# Patient Record
Sex: FEMALE | Race: WHITE | NOT HISPANIC OR LATINO | Employment: UNEMPLOYED | URBAN - METROPOLITAN AREA
[De-identification: names, ages, dates, MRNs, and addresses within clinical notes are randomized per-mention and may not be internally consistent; named-entity substitution may affect disease eponyms.]

---

## 2019-01-24 ENCOUNTER — HOSPITAL ENCOUNTER (OUTPATIENT)
Dept: CARDIOLOGY | Facility: HOSPITAL | Age: 39
Discharge: HOME | End: 2019-01-24
Attending: INTERNAL MEDICINE

## 2019-01-24 ENCOUNTER — HOSPITAL ENCOUNTER (OUTPATIENT)
Dept: CARDIOLOGY | Facility: HOSPITAL | Age: 39
Discharge: HOME | End: 2019-01-24
Admitting: INTERNAL MEDICINE

## 2019-01-24 VITALS
HEIGHT: 66 IN | OXYGEN SATURATION: 100 % | HEART RATE: 86 BPM | SYSTOLIC BLOOD PRESSURE: 140 MMHG | BODY MASS INDEX: 22.5 KG/M2 | DIASTOLIC BLOOD PRESSURE: 100 MMHG | WEIGHT: 140 LBS

## 2019-01-24 VITALS
BODY MASS INDEX: 22.5 KG/M2 | SYSTOLIC BLOOD PRESSURE: 143 MMHG | WEIGHT: 140 LBS | HEIGHT: 66 IN | HEART RATE: 84 BPM | OXYGEN SATURATION: 100 % | RESPIRATION RATE: 18 BRPM | DIASTOLIC BLOOD PRESSURE: 100 MMHG

## 2019-01-24 DIAGNOSIS — R07.9 CHEST PAIN, UNSPECIFIED TYPE: ICD-10-CM

## 2019-01-24 DIAGNOSIS — R74.8 ELEVATED LIVER ENZYMES: Primary | ICD-10-CM

## 2019-01-24 DIAGNOSIS — R06.02 SHORTNESS OF BREATH: ICD-10-CM

## 2019-01-24 DIAGNOSIS — I10 ESSENTIAL HYPERTENSION: ICD-10-CM

## 2019-01-24 DIAGNOSIS — R79.89 LOW TSH LEVEL: ICD-10-CM

## 2019-01-24 LAB
ALBUMIN SERPL-MCNC: 4.9 G/DL (ref 3.5–5.2)
ALBUMIN/GLOB SERPL: 1.5 G/DL
ALP SERPL-CCNC: 59 U/L (ref 39–117)
ALT SERPL W P-5'-P-CCNC: 205 U/L (ref 1–33)
ANION GAP SERPL CALCULATED.3IONS-SCNC: 14.9 MMOL/L
AORTIC DIMENSIONLESS INDEX: 0.7 (DI)
ASCENDING AORTA: 2.5 CM
AST SERPL-CCNC: 223 U/L (ref 1–32)
BASOPHILS # BLD AUTO: 0.01 10*3/MM3 (ref 0–0.2)
BASOPHILS NFR BLD AUTO: 0.1 % (ref 0–1.5)
BH CV ECHO MEAS - ACS: 1.8 CM
BH CV ECHO MEAS - AO MAX PG (FULL): 3.6 MMHG
BH CV ECHO MEAS - AO MAX PG: 6.8 MMHG
BH CV ECHO MEAS - AO MEAN PG (FULL): 2.1 MMHG
BH CV ECHO MEAS - AO MEAN PG: 4.1 MMHG
BH CV ECHO MEAS - AO ROOT AREA (BSA CORRECTED): 1.4
BH CV ECHO MEAS - AO ROOT AREA: 4.5 CM^2
BH CV ECHO MEAS - AO ROOT DIAM: 2.4 CM
BH CV ECHO MEAS - AO V2 MAX: 130.3 CM/SEC
BH CV ECHO MEAS - AO V2 MEAN: 95.6 CM/SEC
BH CV ECHO MEAS - AO V2 VTI: 26.2 CM
BH CV ECHO MEAS - ASC AORTA: 2.5 CM
BH CV ECHO MEAS - AVA(I,A): 1.8 CM^2
BH CV ECHO MEAS - AVA(I,D): 1.8 CM^2
BH CV ECHO MEAS - AVA(V,A): 1.8 CM^2
BH CV ECHO MEAS - AVA(V,D): 1.8 CM^2
BH CV ECHO MEAS - BSA(HAYCOCK): 1.7 M^2
BH CV ECHO MEAS - BSA: 1.7 M^2
BH CV ECHO MEAS - BZI_BMI: 22.6 KILOGRAMS/M^2
BH CV ECHO MEAS - BZI_METRIC_HEIGHT: 167.6 CM
BH CV ECHO MEAS - BZI_METRIC_WEIGHT: 63.5 KG
BH CV ECHO MEAS - EDV(CUBED): 77.5 ML
BH CV ECHO MEAS - EDV(MOD-SP2): 72 ML
BH CV ECHO MEAS - EDV(MOD-SP4): 61 ML
BH CV ECHO MEAS - EDV(TEICH): 81.4 ML
BH CV ECHO MEAS - EF(CUBED): 78 %
BH CV ECHO MEAS - EF(MOD-BP): 61 %
BH CV ECHO MEAS - EF(MOD-SP2): 58.3 %
BH CV ECHO MEAS - EF(MOD-SP4): 62.3 %
BH CV ECHO MEAS - EF(TEICH): 70.5 %
BH CV ECHO MEAS - ESV(CUBED): 17 ML
BH CV ECHO MEAS - ESV(MOD-SP2): 30 ML
BH CV ECHO MEAS - ESV(MOD-SP4): 23 ML
BH CV ECHO MEAS - ESV(TEICH): 24 ML
BH CV ECHO MEAS - FS: 39.6 %
BH CV ECHO MEAS - IVS/LVPW: 1.1
BH CV ECHO MEAS - IVSD: 0.96 CM
BH CV ECHO MEAS - LAT PEAK E' VEL: 15 CM/SEC
BH CV ECHO MEAS - LV DIASTOLIC VOL/BSA (35-75): 35.5 ML/M^2
BH CV ECHO MEAS - LV MASS(C)D: 125.6 GRAMS
BH CV ECHO MEAS - LV MASS(C)DI: 73.1 GRAMS/M^2
BH CV ECHO MEAS - LV MAX PG: 3.2 MMHG
BH CV ECHO MEAS - LV MEAN PG: 1.9 MMHG
BH CV ECHO MEAS - LV SYSTOLIC VOL/BSA (12-30): 13.4 ML/M^2
BH CV ECHO MEAS - LV V1 MAX: 89.7 CM/SEC
BH CV ECHO MEAS - LV V1 MEAN: 66.2 CM/SEC
BH CV ECHO MEAS - LV V1 VTI: 17.7 CM
BH CV ECHO MEAS - LVIDD: 4.3 CM
BH CV ECHO MEAS - LVIDS: 2.6 CM
BH CV ECHO MEAS - LVLD AP2: 9 CM
BH CV ECHO MEAS - LVLD AP4: 8.7 CM
BH CV ECHO MEAS - LVLS AP2: 7.2 CM
BH CV ECHO MEAS - LVLS AP4: 6.9 CM
BH CV ECHO MEAS - LVOT AREA (M): 2.5 CM^2
BH CV ECHO MEAS - LVOT AREA: 2.6 CM^2
BH CV ECHO MEAS - LVOT DIAM: 1.8 CM
BH CV ECHO MEAS - LVPWD: 0.88 CM
BH CV ECHO MEAS - MED PEAK E' VEL: 12 CM/SEC
BH CV ECHO MEAS - MV A DUR: 0.11 SEC
BH CV ECHO MEAS - MV A MAX VEL: 60.6 CM/SEC
BH CV ECHO MEAS - MV DEC SLOPE: 310.3 CM/SEC^2
BH CV ECHO MEAS - MV DEC TIME: 0.25 SEC
BH CV ECHO MEAS - MV E MAX VEL: 58.7 CM/SEC
BH CV ECHO MEAS - MV E/A: 0.97
BH CV ECHO MEAS - MV MAX PG: 2.1 MMHG
BH CV ECHO MEAS - MV MEAN PG: 1.2 MMHG
BH CV ECHO MEAS - MV P1/2T MAX VEL: 72.1 CM/SEC
BH CV ECHO MEAS - MV P1/2T: 68.1 MSEC
BH CV ECHO MEAS - MV V2 MAX: 73 CM/SEC
BH CV ECHO MEAS - MV V2 MEAN: 52.5 CM/SEC
BH CV ECHO MEAS - MV V2 VTI: 22.4 CM
BH CV ECHO MEAS - MVA P1/2T LCG: 3.1 CM^2
BH CV ECHO MEAS - MVA(P1/2T): 3.2 CM^2
BH CV ECHO MEAS - MVA(VTI): 2.1 CM^2
BH CV ECHO MEAS - PA ACC TIME: 0.07 SEC
BH CV ECHO MEAS - PA MAX PG (FULL): 3.2 MMHG
BH CV ECHO MEAS - PA MAX PG: 5.2 MMHG
BH CV ECHO MEAS - PA PR(ACCEL): 45.7 MMHG
BH CV ECHO MEAS - PA V2 MAX: 114 CM/SEC
BH CV ECHO MEAS - PULM A REVS DUR: 0.1 SEC
BH CV ECHO MEAS - PULM A REVS VEL: 64 CM/SEC
BH CV ECHO MEAS - PULM DIAS VEL: 66 CM/SEC
BH CV ECHO MEAS - PULM S/D: 0.91
BH CV ECHO MEAS - PULM SYS VEL: 60.1 CM/SEC
BH CV ECHO MEAS - PVA(V,A): 1.6 CM^2
BH CV ECHO MEAS - PVA(V,D): 1.6 CM^2
BH CV ECHO MEAS - QP/QS: 1
BH CV ECHO MEAS - RV MAX PG: 2 MMHG
BH CV ECHO MEAS - RV MEAN PG: 1.2 MMHG
BH CV ECHO MEAS - RV V1 MAX: 69.8 CM/SEC
BH CV ECHO MEAS - RV V1 MEAN: 52.3 CM/SEC
BH CV ECHO MEAS - RV V1 VTI: 18.1 CM
BH CV ECHO MEAS - RVOT AREA: 2.7 CM^2
BH CV ECHO MEAS - RVOT DIAM: 1.8 CM
BH CV ECHO MEAS - SI(AO): 68 ML/M^2
BH CV ECHO MEAS - SI(CUBED): 35.2 ML/M^2
BH CV ECHO MEAS - SI(LVOT): 27.2 ML/M^2
BH CV ECHO MEAS - SI(MOD-SP2): 24.4 ML/M^2
BH CV ECHO MEAS - SI(MOD-SP4): 22.1 ML/M^2
BH CV ECHO MEAS - SI(TEICH): 33.4 ML/M^2
BH CV ECHO MEAS - SUP REN AO DIAM: 1.7 CM
BH CV ECHO MEAS - SV(AO): 116.9 ML
BH CV ECHO MEAS - SV(CUBED): 60.5 ML
BH CV ECHO MEAS - SV(LVOT): 46.8 ML
BH CV ECHO MEAS - SV(MOD-SP2): 42 ML
BH CV ECHO MEAS - SV(MOD-SP4): 38 ML
BH CV ECHO MEAS - SV(RVOT): 48.2 ML
BH CV ECHO MEAS - SV(TEICH): 57.4 ML
BH CV ECHO MEAS - TAPSE (>1.6): 2 CM2
BH CV ECHO MEASUREMENTS AVERAGE E/E' RATIO: 4.35
BH CV STRESS BP STAGE 1: NORMAL
BH CV STRESS BP STAGE 2: NORMAL
BH CV STRESS BP STAGE 3: NORMAL
BH CV STRESS DURATION MIN STAGE 1: 3
BH CV STRESS DURATION MIN STAGE 2: 3
BH CV STRESS DURATION MIN STAGE 3: 3
BH CV STRESS DURATION SEC STAGE 1: 0
BH CV STRESS DURATION SEC STAGE 2: 0
BH CV STRESS DURATION SEC STAGE 3: 0
BH CV STRESS GRADE STAGE 1: 10
BH CV STRESS GRADE STAGE 2: 12
BH CV STRESS GRADE STAGE 3: 14
BH CV STRESS HR STAGE 1: 128
BH CV STRESS HR STAGE 2: 162
BH CV STRESS HR STAGE 3: 179
BH CV STRESS METS STAGE 1: 5
BH CV STRESS METS STAGE 2: 7.5
BH CV STRESS METS STAGE 3: 10
BH CV STRESS PROTOCOL 1: NORMAL
BH CV STRESS RECOVERY BP: NORMAL MMHG
BH CV STRESS RECOVERY HR: 101 BPM
BH CV STRESS SPEED STAGE 1: 1.7
BH CV STRESS SPEED STAGE 2: 2.5
BH CV STRESS SPEED STAGE 3: 3.4
BH CV STRESS STAGE 1: 1
BH CV STRESS STAGE 2: 2
BH CV STRESS STAGE 3: 3
BH CV VAS BP RIGHT ARM: NORMAL MMHG
BH CV XLRA - RV BASE: 2.87 CM
BH CV XLRA - TDI S': 10 CM/SEC
BILIRUB SERPL-MCNC: 0.8 MG/DL (ref 0.1–1.2)
BUN BLD-MCNC: 14 MG/DL (ref 6–20)
BUN/CREAT SERPL: 18.9 (ref 7–25)
CALCIUM SPEC-SCNC: 10 MG/DL (ref 8.6–10.5)
CHLORIDE SERPL-SCNC: 96 MMOL/L (ref 98–107)
CO2 SERPL-SCNC: 26.1 MMOL/L (ref 22–29)
CREAT BLD-MCNC: 0.74 MG/DL (ref 0.57–1)
D DIMER PPP FEU-MCNC: <0.27 MCGFEU/ML (ref 0–0.49)
DEPRECATED RDW RBC AUTO: 43.4 FL (ref 37–54)
EOSINOPHIL # BLD AUTO: 0.08 10*3/MM3 (ref 0–0.7)
EOSINOPHIL NFR BLD AUTO: 1.2 % (ref 0.3–6.2)
ERYTHROCYTE [DISTWIDTH] IN BLOOD BY AUTOMATED COUNT: 12 % (ref 11.7–13)
GFR SERPL CREATININE-BSD FRML MDRD: 88 ML/MIN/1.73
GLOBULIN UR ELPH-MCNC: 3.2 GM/DL
GLUCOSE BLD-MCNC: 111 MG/DL (ref 65–99)
HCT VFR BLD AUTO: 42.2 % (ref 35.6–45.5)
HGB BLD-MCNC: 14.7 G/DL (ref 11.9–15.5)
IMM GRANULOCYTES # BLD AUTO: 0 10*3/MM3 (ref 0–0.03)
IMM GRANULOCYTES NFR BLD AUTO: 0 % (ref 0–0.5)
LEFT ATRIUM VOLUME INDEX: 12 ML/M2
LYMPHOCYTES # BLD AUTO: 1.02 10*3/MM3 (ref 0.9–4.8)
LYMPHOCYTES NFR BLD AUTO: 14.8 % (ref 19.6–45.3)
MAXIMAL PREDICTED HEART RATE: 183 BPM
MAXIMAL PREDICTED HEART RATE: 183 BPM
MCH RBC QN AUTO: 34.2 PG (ref 26.9–32)
MCHC RBC AUTO-ENTMCNC: 34.8 G/DL (ref 32.4–36.3)
MCV RBC AUTO: 98.1 FL (ref 80.5–98.2)
MONOCYTES # BLD AUTO: 0.55 10*3/MM3 (ref 0.2–1.2)
MONOCYTES NFR BLD AUTO: 8 % (ref 5–12)
NEUTROPHILS # BLD AUTO: 5.23 10*3/MM3 (ref 1.9–8.1)
NEUTROPHILS NFR BLD AUTO: 75.9 % (ref 42.7–76)
NT-PROBNP SERPL-MCNC: 5.7 PG/ML (ref 0–450)
PERCENT MAX PREDICTED HR: 96.17 %
PLATELET # BLD AUTO: 209 10*3/MM3 (ref 140–500)
PMV BLD AUTO: 9.2 FL (ref 6–12)
POTASSIUM BLD-SCNC: 3.7 MMOL/L (ref 3.5–5.2)
PROT SERPL-MCNC: 8.1 G/DL (ref 6–8.5)
RBC # BLD AUTO: 4.3 10*6/MM3 (ref 3.9–5.2)
SINUS: 2.46 CM
SODIUM BLD-SCNC: 137 MMOL/L (ref 136–145)
STJ: 2.3 CM
STRESS BASELINE BP: NORMAL MMHG
STRESS BASELINE HR: 86 BPM
STRESS PERCENT HR: 113 %
STRESS POST ESTIMATED WORKLOAD: 10 METS
STRESS POST EXERCISE DUR MIN: 9 MIN
STRESS POST EXERCISE DUR SEC: 0 SEC
STRESS POST PEAK BP: NORMAL MMHG
STRESS POST PEAK HR: 176 BPM
STRESS TARGET HR: 156 BPM
STRESS TARGET HR: 156 BPM
TROPONIN T SERPL-MCNC: <0.01 NG/ML (ref 0–0.03)
WBC NRBC COR # BLD: 6.89 10*3/MM3 (ref 4.5–10.7)

## 2019-01-24 PROCEDURE — 84484 ASSAY OF TROPONIN QUANT: CPT | Performed by: INTERNAL MEDICINE

## 2019-01-24 PROCEDURE — 93306 TTE W/DOPPLER COMPLETE: CPT | Performed by: INTERNAL MEDICINE

## 2019-01-24 PROCEDURE — 93306 TTE W/DOPPLER COMPLETE: CPT

## 2019-01-24 PROCEDURE — 93017 CV STRESS TEST TRACING ONLY: CPT

## 2019-01-24 PROCEDURE — 94760 N-INVAS EAR/PLS OXIMETRY 1: CPT

## 2019-01-24 PROCEDURE — 85379 FIBRIN DEGRADATION QUANT: CPT | Performed by: INTERNAL MEDICINE

## 2019-01-24 PROCEDURE — 83880 ASSAY OF NATRIURETIC PEPTIDE: CPT | Performed by: INTERNAL MEDICINE

## 2019-01-24 PROCEDURE — 85025 COMPLETE CBC W/AUTO DIFF WBC: CPT | Performed by: INTERNAL MEDICINE

## 2019-01-24 PROCEDURE — 99244 OFF/OP CNSLTJ NEW/EST MOD 40: CPT | Performed by: INTERNAL MEDICINE

## 2019-01-24 PROCEDURE — 36415 COLL VENOUS BLD VENIPUNCTURE: CPT

## 2019-01-24 PROCEDURE — 80053 COMPREHEN METABOLIC PANEL: CPT | Performed by: INTERNAL MEDICINE

## 2019-01-24 PROCEDURE — 93016 CV STRESS TEST SUPVJ ONLY: CPT | Performed by: INTERNAL MEDICINE

## 2019-01-24 PROCEDURE — 93018 CV STRESS TEST I&R ONLY: CPT | Performed by: INTERNAL MEDICINE

## 2019-01-24 RX ORDER — SODIUM CHLORIDE 0.9 % (FLUSH) 0.9 %
10 SYRINGE (ML) INJECTION AS NEEDED
Status: SHIPPED | OUTPATIENT
Start: 2019-01-24

## 2019-01-24 RX ORDER — NITROGLYCERIN 0.4 MG/1
0.4 TABLET SUBLINGUAL
Status: SHIPPED | OUTPATIENT
Start: 2019-01-24

## 2019-01-24 NOTE — ADDENDUM NOTE
Encounter addended by: Rigo Nair RN on: 1/24/2019 1:51 PM   Actions taken: LDA properties accepted, Sign clinical note, Charge Capture section accepted

## 2019-01-24 NOTE — PATIENT INSTRUCTIONS
As directed by Dr. Coelho, follow-up with your primary care provider regarding your liver enzymes.

## 2019-01-24 NOTE — PROGRESS NOTES
Date of Office Visit: 19  Encounter Provider: Safia Coelho MD  Place of Service: [facility] CARDIOLOGY  Patient Name: Nay Tello  :1980  Referral Provider:Mikhail Lundberg MD      She was referred here for consultation evaluation for chest tightness by Raúl STERLING.    She has a known history of hypertension and anxiety.  She's noticed for the last week and half that she's had chest tightness.  She went on a run with her kids.  Next morning, she woke up with chest tightness.  It is gone progressively worse since then.  This morning she had a very bad episode of her chest was very tight, she felt her heart racing.  She felt a sense of impending doom.  She didn't pass out.  She was shaky, weak and sweaty.  She was also having significant dyspnea.  She's been having episodes like this intermittently.  When they come on they last for 5-10 minutes and then they finally dissipate.    She's never had any prior heart rhythm disturbances but she's had a chronically low TSH and it was checked 19 at 0.262.    She has no history of myocardial infarction or heart failure.  She's never had rheumatic fever or heart rhythm disturbances.  She has no history of diabetes or hyperlipidemia.  Her lipids from 2019 and her LDL was 167, HDL 87.    She has had a recent change her medications in that they started lisinopril HCT because her blood pressure was elevated. She was on just lisinopril prior.     She's never had cancer, blood clots, renal disease.  She doesn't smoke but has 2 glasses of wine roughly per day.  She uses no Drugs.  She uses no decongestants or diet pills and no supplements.    There is no family history of early cardiac disease.    Her troponin, d-dimer, proBNP, CBC were normal.  CMP was normal except  and  - this was normal 1 year ago.     Chief Complaint   Patient presents with   • Chest Pain     Chest tightness accompanied  by shortness of breath for the last two weeks     History of Present Illness  History of Present Illness      Past Medical History:   Diagnosis Date   • Anemia     H/O iron deficiency   • Disease of thyroid gland    • Hypertension          Past Surgical History:   Procedure Laterality Date   • LEEP           Current Outpatient Medications on File Prior to Encounter   Medication Sig Dispense Refill   • lisinopril-hydrochlorothiazide (PRINZIDE,ZESTORETIC) 10-12.5 MG per tablet Take 1 tablet by mouth Daily.     • sertraline (ZOLOFT) 25 MG tablet Take 25 mg by mouth.     • [DISCONTINUED] Omeprazole Magnesium (PRILOSEC OTC PO) Take  by mouth.     • [DISCONTINUED] Prenatal Vit-Fe Fumarate-FA (PRENATAL PO) Take  by mouth.       No current facility-administered medications on file prior to encounter.          Social History     Socioeconomic History   • Marital status:      Spouse name: Not on file   • Number of children: Not on file   • Years of education: College   • Highest education level: Not on file   Social Needs   • Financial resource strain: Not on file   • Food insecurity - worry: Not on file   • Food insecurity - inability: Not on file   • Transportation needs - medical: Not on file   • Transportation needs - non-medical: Not on file   Occupational History     Employer: UNEMPLOYED   Tobacco Use   • Smoking status: Former Smoker     Packs/day: 0.50     Years: 10.00     Pack years: 5.00     Types: Cigarettes   • Tobacco comment: Social only   Substance and Sexual Activity   • Alcohol use: Yes     Comment: DAILY   • Drug use: No   • Sexual activity: Yes     Partners: Male     Comment:     Other Topics Concern   • Not on file   Social History Narrative   • Not on file       Family History   Problem Relation Age of Onset   • Thyroid disease Mother    • Hypertension Father          Review of Systems   Constitution: Negative.   HENT: Negative for congestion.    Eyes: Negative for vision loss in left eye  "and vision loss in right eye.   Respiratory: Negative.  Negative for cough, hemoptysis, shortness of breath, sleep disturbances due to breathing, snoring, sputum production and wheezing.    Endocrine: Negative.    Hematologic/Lymphatic: Negative.    Skin: Negative for poor wound healing and rash.   Musculoskeletal: Negative for falls, gout, muscle cramps and myalgias.   Gastrointestinal: Negative for abdominal pain, diarrhea, dysphagia, hematemesis, melena, nausea and vomiting.   Neurological: Negative for excessive daytime sleepiness, dizziness, headaches, light-headedness, loss of balance, seizures and vertigo.   Psychiatric/Behavioral: Negative for depression and substance abuse. The patient is not nervous/anxious.        Procedures    Procedures        Objective:    /100 (BP Location: Left arm, Patient Position: Sitting) Comment: 155/97 right arm  Pulse 84   Resp 18   Ht 167.6 cm (66\")   Wt 63.5 kg (140 lb)   SpO2 100%   BMI 22.60 kg/m²        Physical Exam  Physical Exam   Constitutional: She is oriented to person, place, and time. She appears well-developed and well-nourished. No distress.   HENT:   Head: Normocephalic and atraumatic.   Eyes: Conjunctivae are normal. No scleral icterus.   Neck: Neck supple. No JVD present. Carotid bruit is not present. No thyromegaly present.   Cardiovascular: Normal rate, regular rhythm, S1 normal, S2 normal, normal heart sounds and intact distal pulses.  No extrasystoles are present. PMI is not displaced. Exam reveals no gallop.   No murmur heard.  Pulses:       Carotid pulses are 2+ on the right side, and 2+ on the left side.       Radial pulses are 2+ on the right side, and 2+ on the left side.        Dorsalis pedis pulses are 2+ on the right side, and 2+ on the left side.        Posterior tibial pulses are 2+ on the right side, and 2+ on the left side.   Pulmonary/Chest: Effort normal and breath sounds normal. No respiratory distress. She has no wheezes. She " has no rhonchi. She has no rales. She exhibits no tenderness.   Abdominal: Soft. Bowel sounds are normal. She exhibits no distension, no abdominal bruit and no mass. There is no tenderness.   Musculoskeletal: She exhibits no edema or deformity.   Lymphadenopathy:     She has no cervical adenopathy.   Neurological: She is alert and oriented to person, place, and time. No cranial nerve deficit.   Skin: Skin is warm and dry. No rash noted. She is not diaphoretic. No cyanosis. No pallor. Nails show no clubbing.   Psychiatric: She has a normal mood and affect. Judgment normal.   Vitals reviewed.          Assessment:      1. Chest pain with dyspnea and tachycardia, set up treadmill stress study and echocardiogram.  If normal, home with holter.   2. Hypertension. BP is still high.  3. Low TSH, chronic.  4. Elevated liver enzymes, this is new.        Plan:       F/u in 1 month with melody if testing is normal, stop alcohol and acetaminophen.      Addendum: treadmill stress and echo are normal - home with holter.

## 2021-04-16 ENCOUNTER — BULK ORDERING (OUTPATIENT)
Dept: CASE MANAGEMENT | Facility: OTHER | Age: 41
End: 2021-04-16

## 2021-04-16 DIAGNOSIS — Z23 IMMUNIZATION DUE: ICD-10-CM

## 2022-11-04 ENCOUNTER — APPOINTMENT (OUTPATIENT)
Dept: WOMENS IMAGING | Facility: HOSPITAL | Age: 42
End: 2022-11-04

## 2022-11-04 PROCEDURE — 77067 SCR MAMMO BI INCL CAD: CPT | Performed by: RADIOLOGY

## 2022-11-04 PROCEDURE — 77063 BREAST TOMOSYNTHESIS BI: CPT | Performed by: RADIOLOGY

## 2024-09-03 ENCOUNTER — TELEPHONE (OUTPATIENT)
Dept: INTERNAL MEDICINE | Facility: CLINIC | Age: 44
End: 2024-09-03

## 2024-09-03 NOTE — TELEPHONE ENCOUNTER
Caller: Eliza Tello    Relationship: Self    Best call back number: [phone]     What was the call regarding: ELIZA STATES HER  TAMMY TELLO IS A CURRENT PATIENT OF DR FLANAGAN SO SHE IS WANTING TO KNOW IF HE IS WILLING TO TAKE HER ON AS A PATIENT AS WELL. SHE STATES SHE JUST NEEDS A PHYSICAL

## 2024-11-01 ENCOUNTER — OFFICE VISIT (OUTPATIENT)
Dept: INTERNAL MEDICINE | Facility: CLINIC | Age: 44
End: 2024-11-01
Payer: COMMERCIAL

## 2024-11-01 VITALS
RESPIRATION RATE: 16 BRPM | DIASTOLIC BLOOD PRESSURE: 92 MMHG | BODY MASS INDEX: 22.5 KG/M2 | HEIGHT: 66 IN | TEMPERATURE: 98.5 F | WEIGHT: 140 LBS | SYSTOLIC BLOOD PRESSURE: 134 MMHG | HEART RATE: 89 BPM | OXYGEN SATURATION: 98 %

## 2024-11-01 DIAGNOSIS — Z00.00 HEALTHCARE MAINTENANCE: ICD-10-CM

## 2024-11-01 DIAGNOSIS — Z12.31 ENCOUNTER FOR SCREENING MAMMOGRAM FOR MALIGNANT NEOPLASM OF BREAST: ICD-10-CM

## 2024-11-01 DIAGNOSIS — Z00.00 WELL WOMAN EXAM (NO GYNECOLOGICAL EXAM): Primary | ICD-10-CM

## 2024-11-01 NOTE — PROGRESS NOTES
Subjective   Nay Tello is a 44 y.o. female and is here for a comprehensive physical exam. The patient reports no problems.    Pt is due for annual gyn exam and mammo           Social History:   Social History     Socioeconomic History    Marital status:     Years of education: College   Tobacco Use    Smoking status: Former     Current packs/day: 0.50     Average packs/day: 0.5 packs/day for 10.0 years (5.0 ttl pk-yrs)     Types: Cigarettes    Tobacco comments:     Social only   Vaping Use    Vaping status: Never Used   Substance and Sexual Activity    Alcohol use: Yes     Alcohol/week: 2.0 standard drinks of alcohol     Types: 2 Glasses of wine per week     Comment: DAILY    Drug use: No    Sexual activity: Yes     Partners: Male     Comment:         Family History:   Family History   Problem Relation Age of Onset    Thyroid disease Mother     Hypertension Father        Past Medical History:   Past Medical History:   Diagnosis Date    Anemia     H/O iron deficiency    Disease of thyroid gland     Hypertension        The following portions of the patient's history were reviewed and updated as appropriate: allergies, current medications, past family history, past medical history, past social history, past surgical history and problem list.    Review of Systems    Review of Systems   Constitutional:  Negative for chills and fever.   HENT:  Negative for congestion, rhinorrhea, sinus pain and sore throat.    Eyes:  Negative for photophobia and visual disturbance.   Respiratory:  Negative for cough, chest tightness and shortness of breath.    Cardiovascular:  Negative for chest pain and palpitations.   Gastrointestinal:  Negative for diarrhea, nausea and vomiting.   Genitourinary:  Negative for dysuria, frequency and urgency.   Skin:  Negative for rash and wound.   Neurological:  Negative for dizziness and syncope.   Psychiatric/Behavioral:  Negative for behavioral problems and confusion.         Objective   Physical Exam  Vitals and nursing note reviewed.   Constitutional:       Appearance: She is well-developed.   HENT:      Head: Normocephalic and atraumatic.      Right Ear: External ear normal.      Left Ear: External ear normal.   Cardiovascular:      Rate and Rhythm: Normal rate and regular rhythm.      Heart sounds: Normal heart sounds.   Pulmonary:      Effort: Pulmonary effort is normal. No respiratory distress.      Breath sounds: Normal breath sounds.   Abdominal:      Palpations: Abdomen is soft.      Tenderness: There is no abdominal tenderness. There is no guarding.   Musculoskeletal:         General: Normal range of motion.      Cervical back: Normal range of motion and neck supple.   Lymphadenopathy:      Cervical: No cervical adenopathy.   Skin:     General: Skin is warm.   Neurological:      Mental Status: She is alert and oriented to person, place, and time.   Psychiatric:         Behavior: Behavior normal.         Vitals:    11/01/24 0900   BP: 134/92   Pulse: 89   Resp: 16   Temp: 98.5 °F (36.9 °C)   SpO2: 98%     Body mass index is 22.6 kg/m².      Medications: No current outpatient medications on file.    Current Facility-Administered Medications:     nitroglycerin (NITROSTAT) SL tablet 0.4 mg, 0.4 mg, Sublingual, Q5 Min PRN, Safia Coelho MD    sodium chloride 0.9 % flush 10 mL, 10 mL, Intravenous, PRN, Safia Coelho MD       Assessment & Plan   Healthy female exam.      1. Healthcare Maintenance:  2. Patient Counseling:  --Nutrition: Stressed importance of moderation in sodium/caffeine intake, saturated fat and cholesterol, caloric balance, sufficient intake of fresh fruits, vegetables, fiber, calcium and vit D  --Exercise: Recommended 30 minutes of exercise daily.  --Immunizations reviewed.  --Discussed benefits of screening colonoscopy.    Diagnoses and all orders for this visit:    Well woman exam (no gynecological exam)    Healthcare maintenance  -     CBC &  Differential  -     Comprehensive Metabolic Panel  -     Hemoglobin A1c  -     Thyroid Panel With TSH  -     Lipid Panel With LDL / HDL Ratio  -     Vitamin D,25-Hydroxy    Encounter for screening mammogram for malignant neoplasm of breast  -     Mammo Screening Digital Tomosynthesis Bilateral With CAD; Future        No follow-ups on file.             Dictated utilizing Dragon Voice Recognition Software

## 2024-11-02 LAB
25(OH)D3+25(OH)D2 SERPL-MCNC: 33.6 NG/ML (ref 30–100)
ALBUMIN SERPL-MCNC: 4.6 G/DL (ref 3.5–5.2)
ALBUMIN/GLOB SERPL: 1.6 G/DL
ALP SERPL-CCNC: 65 U/L (ref 39–117)
ALT SERPL-CCNC: 145 U/L (ref 1–33)
AST SERPL-CCNC: 189 U/L (ref 1–32)
BASOPHILS # BLD AUTO: 0.02 10*3/MM3 (ref 0–0.2)
BASOPHILS NFR BLD AUTO: 0.4 % (ref 0–1.5)
BILIRUB SERPL-MCNC: 0.8 MG/DL (ref 0–1.2)
BUN SERPL-MCNC: 11 MG/DL (ref 6–20)
BUN/CREAT SERPL: 17.2 (ref 7–25)
CALCIUM SERPL-MCNC: 9.5 MG/DL (ref 8.6–10.5)
CHLORIDE SERPL-SCNC: 99 MMOL/L (ref 98–107)
CHOLEST SERPL-MCNC: 277 MG/DL (ref 0–200)
CO2 SERPL-SCNC: 21.1 MMOL/L (ref 22–29)
CREAT SERPL-MCNC: 0.64 MG/DL (ref 0.57–1)
EGFRCR SERPLBLD CKD-EPI 2021: 112.6 ML/MIN/1.73
EOSINOPHIL # BLD AUTO: 0.11 10*3/MM3 (ref 0–0.4)
EOSINOPHIL NFR BLD AUTO: 1.9 % (ref 0.3–6.2)
ERYTHROCYTE [DISTWIDTH] IN BLOOD BY AUTOMATED COUNT: 12.3 % (ref 12.3–15.4)
FT4I SERPL CALC-MCNC: 1.7 (ref 1.2–4.9)
GLOBULIN SER CALC-MCNC: 2.8 GM/DL
GLUCOSE SERPL-MCNC: 76 MG/DL (ref 65–99)
HBA1C MFR BLD: 4.5 % (ref 4.8–5.6)
HCT VFR BLD AUTO: 38.3 % (ref 34–46.6)
HDLC SERPL-MCNC: 78 MG/DL (ref 40–60)
HGB BLD-MCNC: 13.4 G/DL (ref 12–15.9)
IMM GRANULOCYTES # BLD AUTO: 0.02 10*3/MM3 (ref 0–0.05)
IMM GRANULOCYTES NFR BLD AUTO: 0.4 % (ref 0–0.5)
LDLC SERPL CALC-MCNC: 164 MG/DL (ref 0–100)
LDLC/HDLC SERPL: 2.06 {RATIO}
LYMPHOCYTES # BLD AUTO: 1.46 10*3/MM3 (ref 0.7–3.1)
LYMPHOCYTES NFR BLD AUTO: 25.6 % (ref 19.6–45.3)
MCH RBC QN AUTO: 34.4 PG (ref 26.6–33)
MCHC RBC AUTO-ENTMCNC: 35 G/DL (ref 31.5–35.7)
MCV RBC AUTO: 98.2 FL (ref 79–97)
MONOCYTES # BLD AUTO: 0.54 10*3/MM3 (ref 0.1–0.9)
MONOCYTES NFR BLD AUTO: 9.5 % (ref 5–12)
NEUTROPHILS # BLD AUTO: 3.55 10*3/MM3 (ref 1.7–7)
NEUTROPHILS NFR BLD AUTO: 62.2 % (ref 42.7–76)
NRBC BLD AUTO-RTO: 0 /100 WBC (ref 0–0.2)
PLATELET # BLD AUTO: 198 10*3/MM3 (ref 140–450)
POTASSIUM SERPL-SCNC: 4.3 MMOL/L (ref 3.5–5.2)
PROT SERPL-MCNC: 7.4 G/DL (ref 6–8.5)
RBC # BLD AUTO: 3.9 10*6/MM3 (ref 3.77–5.28)
SODIUM SERPL-SCNC: 138 MMOL/L (ref 136–145)
T3RU NFR SERPL: 24 % (ref 24–39)
T4 SERPL-MCNC: 7 UG/DL (ref 4.5–12)
TRIGL SERPL-MCNC: 193 MG/DL (ref 0–150)
TSH SERPL DL<=0.005 MIU/L-ACNC: 0.78 UIU/ML (ref 0.45–4.5)
VLDLC SERPL CALC-MCNC: 35 MG/DL (ref 5–40)
WBC # BLD AUTO: 5.7 10*3/MM3 (ref 3.4–10.8)

## 2024-11-09 DIAGNOSIS — R74.8 ELEVATED LIVER ENZYMES: Primary | ICD-10-CM

## 2025-01-21 ENCOUNTER — TELEPHONE (OUTPATIENT)
Dept: OBSTETRICS AND GYNECOLOGY | Age: 45
End: 2025-01-21

## 2025-01-21 NOTE — TELEPHONE ENCOUNTER
Hub staff attempted to follow warm transfer process and was unsuccessful     Caller: Nay Tello    Relationship to patient: Self    Best call back number: [phone]     Patient is needing: PATIENT IS SCHEDULED WITH DR. ZIMMERMAN AS A NEW GYN ANNUAL ON MAY 13, 2025 AND WOULD LIKE TO GO AHEAD AND GET HER MAMMOGRAM SCHEDULED AT [facility] IF POSSIBLE.  PATIENT STATES IT HAS BEEN OVER A YEAR SINCE HER LAST MAMMOGRAM.

## 2025-01-23 NOTE — TELEPHONE ENCOUNTER
Left VM for pt advising that we could not enter order for her mammogram until the pt is seen our office.  Advised her to call her PCP and see if they could enter order

## 2025-03-19 ENCOUNTER — TELEPHONE (OUTPATIENT)
Dept: INTERNAL MEDICINE | Facility: CLINIC | Age: 45
End: 2025-03-19

## 2025-03-19 DIAGNOSIS — R79.89 LOW TSH LEVEL: Primary | ICD-10-CM

## 2025-03-19 NOTE — TELEPHONE ENCOUNTER
Caller: Nay Tello    Relationship to patient: Self    Best call back number:     Patient is needing: PATIENT STATES SHE GOES TO ENDOCRINOLOGY FOR HER THYROID AND THEY ARE REQUIRING A REFERRAL BE SENT TO THEM.  PATIENT STATES SHE SEES DR. ZAIRE BENITO, PATIENT DOES NOT HAVE THE FAX NUMBER; OFFICE NUMBER  [phone] AT [address] [facility], [address],     PLEASE LET THE PATIENT KNOW ONCE THE REFERRAL IS SENT.

## 2025-05-13 ENCOUNTER — OFFICE VISIT (OUTPATIENT)
Dept: OBSTETRICS AND GYNECOLOGY | Age: 45
End: 2025-05-13
Payer: COMMERCIAL

## 2025-05-13 ENCOUNTER — PATIENT ROUNDING (BHMG ONLY) (OUTPATIENT)
Dept: OBSTETRICS AND GYNECOLOGY | Age: 45
End: 2025-05-13
Payer: COMMERCIAL

## 2025-05-13 VITALS
WEIGHT: 142 LBS | SYSTOLIC BLOOD PRESSURE: 138 MMHG | HEIGHT: 66 IN | DIASTOLIC BLOOD PRESSURE: 100 MMHG | BODY MASS INDEX: 22.82 KG/M2

## 2025-05-13 DIAGNOSIS — Z01.419 ENCOUNTER FOR GYNECOLOGICAL EXAMINATION WITHOUT ABNORMAL FINDING: Primary | ICD-10-CM

## 2025-05-13 DIAGNOSIS — Z98.890 H/O LEEP: ICD-10-CM

## 2025-05-13 DIAGNOSIS — Z11.51 SPECIAL SCREENING EXAMINATION FOR HUMAN PAPILLOMAVIRUS (HPV): ICD-10-CM

## 2025-05-13 DIAGNOSIS — Z12.4 SCREENING FOR MALIGNANT NEOPLASM OF THE CERVIX: ICD-10-CM

## 2025-05-13 DIAGNOSIS — N93.9 ABNORMAL UTERINE BLEEDING (AUB): ICD-10-CM

## 2025-05-13 PROBLEM — E04.2 MULTINODULAR GOITER: Status: ACTIVE | Noted: 2019-01-18

## 2025-05-13 PROBLEM — R79.89 LOW TSH LEVEL: Status: RESOLVED | Noted: 2019-01-24 | Resolved: 2025-05-13

## 2025-05-13 RX ORDER — LOSARTAN POTASSIUM 25 MG/1
25 TABLET ORAL DAILY
COMMUNITY
Start: 2025-05-06 | End: 2026-05-06

## 2025-05-13 RX ORDER — ACETAMINOPHEN AND CODEINE PHOSPHATE 120; 12 MG/5ML; MG/5ML
1 SOLUTION ORAL DAILY
Qty: 84 TABLET | Refills: 3 | Status: SHIPPED | OUTPATIENT
Start: 2025-05-13 | End: 2026-05-13

## 2025-05-13 NOTE — PROGRESS NOTES
"Subjective   Nay Tello is a 44 y.o. female presents as new patient today for annual - previous care with Dr Rita Villalpando who retired  - she would like to schedule next appointment in [facility] (2006), last mammogram 2024 Rita Villalpando office benign results due  would like schedule one @ [facility] shaun hawkins , pap normal 2023 . No abnormal paps after Leep . Periods are heavier , irregular for the past 2 years , denies any hot flashes, she started BCP OTC Opill tablets to control periods ,but would like to discuss other options today  .  Discussed treatment options with patient fluting progesterone only pills, Mirena intrauterine device, endometrial ablation and/or hysterectomy.  Discussed with patient that we will start with a transvaginal ultrasound to see if there is any structural reason for heavy periods.  We will also schedule her mammogram for that day and then discuss any further treatment after her ultrasound.  She has 3 boys and so she stays very busy.  They range in age from 8-11.  She would like to continue on the progesterone only pill until we make a decision so I will call in [facility].    History of Present Illness    The following portions of the patient's history were reviewed and updated as appropriate: allergies, current medications, past family history, past medical history, past social history, past surgical history, and problem list.    Review of Systems   Constitutional:  Negative for chills, fatigue and fever.   Gastrointestinal:  Negative for abdominal pain.   Genitourinary:  Positive for menstrual problem. Negative for dysuria, pelvic pain, vaginal bleeding, vaginal discharge and vaginal pain.   All other systems reviewed and are negative.    /100   Ht 167.6 cm (66\")   Wt 64.4 kg (142 lb)   LMP 04/15/2025 (Approximate)   BMI 22.92 kg/m²     Objective   Physical Exam  Vitals and nursing note reviewed.   Constitutional:       Appearance: Normal appearance. She is " well-developed and normal weight.   Neck:      Thyroid: No thyromegaly.   Pulmonary:      Effort: Pulmonary effort is normal.   Chest:   Breasts:     Right: No mass, nipple discharge, skin change or tenderness.      Left: No mass, nipple discharge, skin change or tenderness.   Abdominal:      General: There is no distension.      Palpations: Abdomen is soft.      Tenderness: There is no abdominal tenderness.   Genitourinary:     General: Normal vulva.      Exam position: Lithotomy position.      Labia:         Right: No rash or lesion.         Left: No rash or lesion.       Vagina: Normal. No vaginal discharge or bleeding.      Cervix: No friability, lesion or cervical bleeding.      Uterus: Enlarged. Not tender.       Adnexa:         Right: No mass or tenderness.          Left: No mass or tenderness.     Musculoskeletal:         General: Normal range of motion.      Cervical back: Normal range of motion.   Skin:     General: Skin is warm and dry.      Findings: No rash.   Neurological:      Mental Status: She is alert and oriented to person, place, and time.   Psychiatric:         Mood and Affect: Mood normal.         Behavior: Behavior normal.           Assessment & Plan   Diagnoses and all orders for this visit:    1. Encounter for gynecological examination without abnormal finding (Primary)  -     IgP, Aptima HPV    2. Abnormal uterine bleeding (AUB)  -     norethindrone (MICRONOR) 0.35 MG tablet; Take 1 tablet by mouth Daily.  Dispense: 84 tablet; Refill: 3    3. Screening for malignant neoplasm of the cervix  -     IgP, Aptima HPV    4. Special screening examination for human papillomavirus (HPV)  -     IgP, Aptima HPV    5. H/O LEEP        Counseling was given to patient for the following topics: instructions for management, importance of treatment compliance, and self breast exams  .   Return in about 4 weeks (around 6/10/2025) for mmg and  TVUS and f/u with me .

## 2025-05-15 LAB
CYTOLOGIST CVX/VAG CYTO: NORMAL
CYTOLOGY CVX/VAG DOC CYTO: NORMAL
CYTOLOGY CVX/VAG DOC THIN PREP: NORMAL
DX ICD CODE: NORMAL
HPV I/H RISK 4 DNA CVX QL PROBE+SIG AMP: NEGATIVE
OTHER STN SPEC: NORMAL
SERVICE CMNT-IMP: NORMAL
STAT OF ADQ CVX/VAG CYTO-IMP: NORMAL

## 2025-05-20 ENCOUNTER — OFFICE VISIT (OUTPATIENT)
Dept: INTERNAL MEDICINE | Facility: CLINIC | Age: 45
End: 2025-05-20
Payer: COMMERCIAL

## 2025-05-20 VITALS
SYSTOLIC BLOOD PRESSURE: 138 MMHG | OXYGEN SATURATION: 98 % | DIASTOLIC BLOOD PRESSURE: 94 MMHG | TEMPERATURE: 98 F | RESPIRATION RATE: 14 BRPM | HEART RATE: 88 BPM | BODY MASS INDEX: 23.88 KG/M2 | HEIGHT: 66 IN | WEIGHT: 148.6 LBS

## 2025-05-20 DIAGNOSIS — I10 ESSENTIAL HYPERTENSION: Primary | ICD-10-CM

## 2025-05-20 PROCEDURE — 99213 OFFICE O/P EST LOW 20 MIN: CPT | Performed by: FAMILY MEDICINE

## 2025-05-20 RX ORDER — LOSARTAN POTASSIUM 50 MG/1
50 TABLET ORAL DAILY
Qty: 90 TABLET | Refills: 3 | Status: SHIPPED | OUTPATIENT
Start: 2025-05-20

## 2025-05-20 NOTE — PROGRESS NOTES
"Chief Complaint  Hypertension    Subjective          Nay Tello presents to [facility]  History of Present Illness  The patient came in today to check on her high blood pressure. She was asked to follow up by Dr. Gasca after noticing her blood pressure was slightly elevated during a visit about her thyroid. She's been taking losartan 25 mg for the last 2.5 weeks, but it hasn't really helped her blood pressure. Before she had kids, she was on lisinopril, which seemed to work well for her, though she doesn't remember the exact dose since it was about 12 years ago.    Objective   Vital Signs:   /94 (BP Location: Left arm, Patient Position: Sitting)   Pulse 88   Temp 98 °F (36.7 °C) (Oral)   Resp 14   Ht 167.6 cm (65.98\")   Wt 67.4 kg (148 lb 9.6 oz)   SpO2 98%   BMI 24.00 kg/m²     Physical Exam  Vitals and nursing note reviewed.   Constitutional:       Appearance: She is well-developed.   HENT:      Head: Normocephalic and atraumatic.   Musculoskeletal:      Cervical back: Normal range of motion and neck supple.   Neurological:      Mental Status: She is alert and oriented to person, place, and time.   Psychiatric:         Behavior: Behavior normal.         Physical Exam       Result Review :                 Assessment and Plan    Diagnoses and all orders for this visit:    1. Essential hypertension (Primary)  -     losartan (Cozaar) 50 MG tablet; Take 1 tablet by mouth Daily.  Dispense: 90 tablet; Refill: 3      Assessment & Plan  1. Elevated blood pressure.  - Diastolic blood pressure remains persistently elevated.  - Currently on losartan 25 mg, started 2.5 weeks ago, but has not been effective.  - Dosage of losartan will be increased to 50 mg daily.  - Advised to either double up on current dose or  the new prescription from pharmacy.  -follow up in 3-4 wks.     Follow Up   No follow-ups on file.  Patient was given instructions and counseling regarding her " condition or for health maintenance advice. Please see specific information pulled into the AVS if appropriate.           Patient or patient representative verbalized consent for the use of Ambient Listening during the visit with  Florentino Conner MD for chart documentation. 5/20/2025  10:31 EDT

## 2025-06-19 DIAGNOSIS — Z12.31 SCREENING MAMMOGRAM FOR BREAST CANCER: Primary | ICD-10-CM

## 2025-08-26 ENCOUNTER — TELEPHONE (OUTPATIENT)
Dept: OBSTETRICS AND GYNECOLOGY | Age: 45
End: 2025-08-26
Payer: COMMERCIAL